# Patient Record
Sex: MALE | Race: WHITE | Employment: OTHER | URBAN - METROPOLITAN AREA
[De-identification: names, ages, dates, MRNs, and addresses within clinical notes are randomized per-mention and may not be internally consistent; named-entity substitution may affect disease eponyms.]

---

## 2019-10-28 ENCOUNTER — OFFICE VISIT (OUTPATIENT)
Dept: NEUROLOGY | Age: 64
End: 2019-10-28

## 2019-10-28 VITALS
DIASTOLIC BLOOD PRESSURE: 78 MMHG | OXYGEN SATURATION: 98 % | BODY MASS INDEX: 23.23 KG/M2 | HEART RATE: 70 BPM | SYSTOLIC BLOOD PRESSURE: 128 MMHG | HEIGHT: 74 IN | WEIGHT: 181 LBS

## 2019-10-28 DIAGNOSIS — R25.1 TREMOR: Primary | ICD-10-CM

## 2019-10-28 DIAGNOSIS — M62.81 MUSCLE WEAKNESS: ICD-10-CM

## 2019-10-28 DIAGNOSIS — G25.0 ESSENTIAL TREMOR: ICD-10-CM

## 2019-10-28 DIAGNOSIS — R68.89 OTHER GENERAL SYMPTOMS AND SIGNS: ICD-10-CM

## 2019-10-28 DIAGNOSIS — E55.9 VITAMIN D DEFICIENCY, UNSPECIFIED: ICD-10-CM

## 2019-10-28 RX ORDER — MINERAL OIL
ENEMA (ML) RECTAL
COMMUNITY

## 2019-10-28 RX ORDER — FAMOTIDINE 40 MG/1
40 TABLET, FILM COATED ORAL DAILY
COMMUNITY

## 2019-10-28 RX ORDER — METOPROLOL TARTRATE 50 MG/1
TABLET ORAL DAILY
COMMUNITY

## 2019-10-28 NOTE — LETTER
10/28/19 Patient: Azul Salas YOB: 1955 Date of Visit: 10/28/2019 Bhaskar Caldera [address] [place] [id number] VIA In Basket Dear Lenore Malin MD, Thank you for referring Mr. Azul Salas to [address] [place] [address] for evaluation. My notes for this consultation are attached. If you have questions, please do not hesitate to call me. I look forward to following your patient along with you. Sincerely, Padilla Thompson, DO

## 2019-10-28 NOTE — PROGRESS NOTES
Neurology Note    Patient ID:  Clearance Marking  [id number]  59 y.o.  1955      Date of Consultation:  October 28, 2019    Referring Physician: Dr. Len Nice    Reason for Consultation:  Hand shaking    Subjective: my hand are shaking. History of Present Illness:   Clearance Marking is a 75 y.o. male who was referred for a neurological evaluation. The patient did just retire about 6 months ago and had not noticed any neurological complaints or concerns at that time. It is been over the last 3 months that his wife initially noticed that he was having a subtle tremor in his hands. It was always worse in the right than the left and involve predominantly the index finger. Every so often, the whole hand would shake but if he gripped something tighter it would go away. An example he gives is holding his coffee cup in the morning if he just lightly picks it up he notices a tremor that is mild but develops in his hand. If he  the cup a slight bit tighter the tremor goes away. He has not dropped anything. He does not notice any weakness, numbness, or tingling. His wife also notes that sometimes when he talks with his hands that his index finger may tremor a little bit. A few years ago, his wife noticed that he did have a slight bit of a head tremor but the wife has not noticed that nearly as much now as she did in the past      This tremor has not prevented him from any of his day-to-day activities. He has still been exercising without any difficulty. He also can do all the activities he needs to do both inside the house and outside the house without any challenges. His buttoning is fine. He can perform all fine finger movements. He did think that his writing was slightly worse but that has improved when he began to  that pencil a little tighter. There has been no new medications that have been started. He has not had any hospitalizations.   He does not know of any recent infection that preceded this or vaccine. It does not prevent him from sleeping well. He denies any difficulty with breathing, swallowing, or chewing. Past Medical History:   Diagnosis Date    Asthma     Hyperlipemia         Past Surgical History:   Procedure Laterality Date    HX TYMPANOSTOMY      [place] ESOPHGL BALO DISTENSION DX STD W/PROVOCATION          Family History   Problem Relation Age of Onset    Stroke Father     Cancer Brother       Sister has a tremor. She is the younger sister. Had it for a couple of years. Mother had a tremor later in life. Social History     Tobacco Use    Smoking status: Never Smoker    Smokeless tobacco: Never Used   Substance Use Topics    Alcohol use: Yes        Allergies   Allergen Reactions    Sulfa (Sulfonamide Antibiotics) Unknown (comments)        Prior to Admission medications    Medication Sig Start Date End Date Taking? Authorizing Provider   famotidine (PEPCID) 40 mg tablet Take 40 mg by mouth daily. Yes Provider, Historical   fexofenadine (ALLEGRA) 180 mg tablet Take  by mouth. Yes Provider, Historical   metoprolol tartrate (LOPRESSOR) 50 mg tablet Take  by mouth daily. Yes Provider, Historical   simvastatin (ZOCOR) 80 mg tablet Take 40 mg by mouth nightly. Yes Other, MD Celso   oxycodone-acetaminophen (PERCOCET) 5-325 mg per tablet Take 1 tablet by mouth every four (4) hours as needed for Pain. 10/12/14   Art Falk PA   diazepam (VALIUM) 5 mg tablet Take 1 tablet by mouth every eight (8) hours as needed (spasm). 10/12/14   Art Falk PA   omeprazole (PRILOSEC) 40 mg capsule Take 40 mg by mouth daily. Kevin, MD Celso   omeprazole (PRILOSEC) 20 mg capsule Take 1 Cap by mouth daily. 1/12/13   Lavelle Sevilla MD   sucralfate (CARAFATE) 1 gram tablet Take 1 Tab by mouth four (4) times daily. 1/12/13   Lavelle Sevilla MD       Review of Systems:    General, constitutional: negative  Eyes, vision: negative  Ears, nose, throat: hearing loss. Cardiovascular, heart: negative  Respiratory: negative  Gastrointestinal: negative  Genitourinary: negative  Musculoskeletal: negative  Skin and integumentary: negative  Psychiatric: negative  Endocrine: negative  Neurological: negative, except for HPI  Hematologic/lymphatic: negative  Allergy/immunology: negative      Objective:     Visit Vitals  /78   Pulse 70   Ht 6' 2\" (1.88 m)   Wt 181 lb (82.1 kg)   SpO2 98%   BMI 23.24 kg/m²       Physical Exam:      General:  appears well nourished in no acute distress  Neck: no carotid bruits  Lungs: clear to auscultation  Heart:  no murmurs, regular rate  Lower extremity: peripheral pulses palpable and no edema  Skin: intact    Neurological exam:    Awake, alert, oriented to person, place and time  Recent and remote memory were normal  Attention and concentration were intact  Language was intact. There was no aphasia  Speech: no dysarthria. There is no tremor associated with his speech  Fund of knowledge was preserved  There is no head tremor. Cranial nerves:   II-XII were tested    Perrrla  Fundoscopic examination revealed venous pulsations and no clear abnormalities  Visual fields were full  Eomi, no evidence of nystagmus  Facial sensation:  normal and symmetric  Facial motor: normal and symmetric  Hearing intact  SCM strength intact  Tongue: midline without fasciculations    Motor: Tone normal.  There was no spasticity or cogwheel rigidity    No evidence of fasciculations    Strength testing:   deltoid triceps biceps Wrist ext. Wrist flex. intrinsics Hip flex. Hip ext. Knee ext. Knee flex Dorsi flex Plantar flex   Right 5 5 5 5 5 5 5 5 5 5 5 5   Left 5 5 5 5 5 5 5 5 5 5 5 5         Sensory:  Upper extremity: intact to pp, light touch, and vibration > 10 seconds  Lower extremity: intact to pp, light touch, and vibration > 10 seconds    Reflexes:    Right Left  Biceps  2 2  Triceps 2 2  Brachiorad.  2 2  Patella  2 2  Achilles 2 2    Plantar response: flexor bilaterally      Cerebellar testing: finger/nose and saloni were intact. There was a mild action tremor as well as postural tremor which impacted predominantly the index finger on both of his hands. His finger tapping was not impacted. Romberg: absent    Gait: steady. Heel, toe, and tandem gait were normal    Labs:     Lab Results   Component Value Date/Time    Sodium 141 10/12/2014 10:55 AM    Potassium 3.7 10/12/2014 10:55 AM    Chloride 107 10/12/2014 10:55 AM    Glucose 124 (H) 10/12/2014 10:55 AM    BUN 15 10/12/2014 10:55 AM    Creatinine 1.06 10/12/2014 10:55 AM    Calcium 9.4 10/12/2014 10:55 AM    WBC 6.9 10/12/2014 10:55 AM    HCT 43.2 10/12/2014 10:55 AM    HGB 14.6 10/12/2014 10:55 AM    PLATELET 655 88/94/8466 10:55 AM       Imaging:    No results found for this or any previous visit. Results from [place] encounter on 10/12/14   CT ABD PELV WO CONT    Narrative **Final Report**       ICD Codes / Adm. Diagnosis: 061958   / Flank Pain  Abdominal Pain  Examination:  CT ABD PELVIS WO CON  - [id number] - Oct 12 2014 10:57AM  Accession No:  [id number]  Reason:  Abd Pain/KS      REPORT:  Indication right flank pain    Axial computed tomographic cuts through the abdomen and pelvis without IV   contrast.    There are some cystic changes in the left lobe of the liver similar to prior   examination 8/15/2009. When considering non-IV contrast technique, no   space-occupying lesions within the spleen or pancreas or kidneys. No   conclusive hydronephrosis on the right. Probable calcifications posterior to   the right aspect of the bladder but unchanged since 2009. A dilated fluid-filled appendix is not demonstrated. Slight fascial   thickening in the lower lateral right lower quadrant new since 2009. Indeterminate significance. IMPRESSION:  1. Chronic changes liver of unknown etiology but similar to before generally. 2. No hydronephrosis demonstrated.   3. Slight fascial thickening lower right lower quadrant new since 2009. Unknown etiology or significance. Followup to exclude early inflammatory   process, although etiology unknown, recommended. Signing/Reading Doctor: Kalli Baeza ([id number])    Approved: Kalli Baeza ([id number])  Oct 12 2014 11:15AM                                        Assessment and Plan:   The patient is a pleasant 58-year-old gentleman who presents with a slowly progressive tremor. His neurological examination reveals evidence of an action and essential tremor but no evidence of resting tremor or cogwheel rigidity. Tremor: This most likely is an essential tremor. He does have a strong family history of a tremor in both his mother and his sister. I would like to check him for reversible causes of tremors and will send off a thyroid, B12 and vitamin D levels today  We did talk about potential medications that can help with an essential tremor. We did discuss primidone and propranolol. At this time, the tremor does not impact him to the point that he would like to take a medication for this. He will continue to monitor this and discuss with his wife and may consider a medication in the future. He was reassured that at today's visit there is no evidence of parkinsonism. Given the rest of his examination, I do not think that neuroimaging is necessary at this time but rather perform serological testing. He will begin to track if he notices there are other things that may improve or exacerbate his tremor for the follow-up visit. The patient should return to clinic in 4 months  Renewed medication:none today    I spent  60   minutes with the patient  with over 50 % of the time counseling and coordinating the care plan in regards to the diagnosis, diagnostic testing, and treatment plan. The patient had the ability to ask questions and all questions were answered.          Signed By:  Greer Harris DO FAAN    October 28, 2019

## 2019-10-28 NOTE — PATIENT INSTRUCTIONS

## 2019-10-29 LAB
25(OH)D3+25(OH)D2 SERPL-MCNC: 26.5 NG/ML (ref 30–100)
T4 FREE SERPL-MCNC: 0.95 NG/DL (ref 0.82–1.77)
TSH SERPL DL<=0.005 MIU/L-ACNC: 1.82 UIU/ML (ref 0.45–4.5)
VIT B12 SERPL-MCNC: 270 PG/ML (ref 232–1245)

## 2019-10-29 RX ORDER — ERGOCALCIFEROL 1.25 MG/1
50000 CAPSULE ORAL
Qty: 12 CAP | Refills: 1 | Status: SHIPPED | OUTPATIENT
Start: 2019-10-29 | End: 2020-01-27

## 2019-10-29 NOTE — PROGRESS NOTES
Xiomaracarolina Hussein,    Your vitamin D level was low. I will send a prescription strength Vitamin D that I would like you to take for the next 12 weeks. Thanks.   Dr. Domenico Baker

## 2019-10-29 NOTE — TELEPHONE ENCOUNTER
Spoke to patient and relayed results of lab per dr. Diana Del Real for vit.  D   Let him know to  script for vitamin D

## 2019-11-28 DIAGNOSIS — G25.0 ESSENTIAL TREMOR: ICD-10-CM

## 2019-11-28 DIAGNOSIS — R25.1 TREMOR: ICD-10-CM

## 2019-11-28 DIAGNOSIS — E55.9 VITAMIN D DEFICIENCY, UNSPECIFIED: ICD-10-CM

## 2019-11-28 DIAGNOSIS — M62.81 MUSCLE WEAKNESS: ICD-10-CM

## 2020-01-10 ENCOUNTER — TELEPHONE (OUTPATIENT)
Dept: NEUROLOGY | Age: 65
End: 2020-01-10

## 2020-02-17 ENCOUNTER — OFFICE VISIT (OUTPATIENT)
Dept: NEUROLOGY | Age: 65
End: 2020-02-17

## 2020-02-17 VITALS
OXYGEN SATURATION: 97 % | DIASTOLIC BLOOD PRESSURE: 76 MMHG | BODY MASS INDEX: 23.1 KG/M2 | HEIGHT: 74 IN | HEART RATE: 77 BPM | SYSTOLIC BLOOD PRESSURE: 132 MMHG | WEIGHT: 180 LBS

## 2020-02-17 DIAGNOSIS — G25.0 ESSENTIAL TREMOR: Primary | ICD-10-CM

## 2020-02-17 NOTE — PROGRESS NOTES
Neurology Note    Patient ID:  Jamari Fierro  [id number]  46 y.o.  1955      Date of Consultation:  February 17, 2020    Referring Physician: Dr. Christa Myrick    Reason for Consultation:  Hand shaking    Subjective: my hand are shaking. History of Present Illness:   Jamari Fierro is a 75 y.o. male who returns to the neurology clinic at [facility] for an evaluation. I did initially see the patient on October 28, 2019. Please see my initial consultation which outlines his history of present illness, examination, and treatment plan. In brief summary he was being seen for an essential tremor. After that visit, he did have serology performed and it was recommended that he look into keeping a diary of potential triggers. Medications were discussed at that last visit but he was not interested in a medication to help his tremor. He was found to have a low vitamin D level and started on replacement therapy. The patient reports that since he was started on the vitamin D replacement he does feel that his tremor is less noticeable. Is definitely no worse than his last visit. There are certain times where the tremor may be a slight bit more noticeable but it is not impacting his day-to-day life. He has had no other medical conditions that have arisen and he has not been hospitalized. Past Medical History:   Diagnosis Date    Asthma     Hyperlipemia         Past Surgical History:   Procedure Laterality Date    HX TYMPANOSTOMY      [place] ESOPHGL BALO DISTENSION DX STD W/PROVOCATION          Family History   Problem Relation Age of Onset    Stroke Father     Cancer Brother       Sister has a tremor. She is the younger sister. Had it for a couple of years. Mother had a tremor later in life. Social History     Tobacco Use    Smoking status: Never Smoker    Smokeless tobacco: Never Used   Substance Use Topics    Alcohol use:  Yes        Allergies   Allergen Reactions    Sulfa (Sulfonamide Antibiotics) Unknown (comments)        Prior to Admission medications    Medication Sig Start Date End Date Taking? Authorizing Provider   famotidine (PEPCID) 40 mg tablet Take 40 mg by mouth daily. Yes Provider, Historical   fexofenadine (ALLEGRA) 180 mg tablet Take  by mouth. Yes Provider, Historical   metoprolol tartrate (LOPRESSOR) 50 mg tablet Take  by mouth daily. Yes Provider, Historical   simvastatin (ZOCOR) 80 mg tablet Take 40 mg by mouth nightly. Yes Other, MD Celso   omeprazole (PRILOSEC) 40 mg capsule Take 40 mg by mouth daily. Yes Other, MD Celso   omeprazole (PRILOSEC) 20 mg capsule Take 1 Cap by mouth daily. 1/12/13  Yes Gama Cheatham MD   oxycodone-acetaminophen (PERCOCET) 5-325 mg per tablet Take 1 tablet by mouth every four (4) hours as needed for Pain. 10/12/14   Isiah Falk PA   diazepam (VALIUM) 5 mg tablet Take 1 tablet by mouth every eight (8) hours as needed (spasm). 10/12/14   Isiah Falk PA   sucralfate (CARAFATE) 1 gram tablet Take 1 Tab by mouth four (4) times daily. 1/12/13   Gama Cheatham MD       Review of Systems:    General, constitutional: negative  Eyes, vision: negative  Ears, nose, throat: hearing loss.    Cardiovascular, heart: negative  Respiratory: negative  Gastrointestinal: negative  Genitourinary: negative  Musculoskeletal: negative  Skin and integumentary: negative  Psychiatric: negative  Endocrine: negative  Neurological: negative, except for HPI  Hematologic/lymphatic: negative  Allergy/immunology: negative      Objective:     Visit Vitals  /76   Pulse 77   Ht 6' 2\" (1.88 m)   Wt 180 lb (81.6 kg)   SpO2 97%   BMI 23.11 kg/m²       Physical Exam:      General:  appears well nourished in no acute distress  Neck: no carotid bruits  Lungs: clear to auscultation  Heart:  no murmurs, regular rate  Lower extremity: peripheral pulses palpable and no edema  Skin: intact    Neurological exam:    Awake, alert, oriented to person, place and time  Recent and remote memory were normal  Attention and concentration were intact  Language was intact. There was no aphasia  Speech: no dysarthria. There is no tremor associated with his speech  Fund of knowledge was preserved  There is no head tremor. Cranial nerves:   II-XII were tested    Perrrla  Visual fields were full  Eomi, no evidence of nystagmus  Facial sensation:  normal and symmetric  Facial motor: normal and symmetric  Hearing intact  SCM strength intact  Tongue: midline without fasciculations    Motor: Tone normal.  There was no spasticity or cogwheel rigidity    No evidence of fasciculations    Strength testing:   deltoid triceps biceps Wrist ext. Wrist flex. intrinsics Hip flex. Hip ext. Knee ext. Knee flex Dorsi flex Plantar flex   Right 5 5 5 5 5 5 5 5 5 5 5 5   Left 5 5 5 5 5 5 5 5 5 5 5 5         Sensory:  Upper extremity: intact to pp, light touch, and vibration  Lower extremity: intact to pp, light touch, and vibration  Reflexes:    Right Left  Biceps  2 2  Triceps 2 2  Brachiorad. 2 2  Patella  2 2  Achilles 2 2    Plantar response:  flexor bilaterally      Cerebellar testing: finger/nose and saloni were intact. There was a very mild action tremor as well as postural tremor which impacted predominantly the index finger on both of his hands. It is less on today's examination when compared to last visit. His finger tapping was not impacted. Romberg: absent    Gait: steady. Labs:     Lab Results   Component Value Date/Time    Sodium 141 10/12/2014 10:55 AM    Potassium 3.7 10/12/2014 10:55 AM    Chloride 107 10/12/2014 10:55 AM    Glucose 124 (H) 10/12/2014 10:55 AM    BUN 15 10/12/2014 10:55 AM    Creatinine 1.06 10/12/2014 10:55 AM    Calcium 9.4 10/12/2014 10:55 AM    WBC 6.9 10/12/2014 10:55 AM    HCT 43.2 10/12/2014 10:55 AM    HGB 14.6 10/12/2014 10:55 AM    PLATELET 934 44/54/4546 10:55 AM       Imaging:    No results found for this or any previous visit.     Results from Hospital Encounter encounter on 10/12/14   CT ABD PELV WO CONT    Narrative **Final Report**       ICD Codes / Adm. Diagnosis: 431357   / Flank Pain  Abdominal Pain  Examination:  CT ABD PELVIS WO CON  - [id number] - Oct 12 2014 10:57AM  Accession No:  [id number]  Reason:  Abd Pain/KS      REPORT:  Indication right flank pain    Axial computed tomographic cuts through the abdomen and pelvis without IV   contrast.    There are some cystic changes in the left lobe of the liver similar to prior   examination 8/15/2009. When considering non-IV contrast technique, no   space-occupying lesions within the spleen or pancreas or kidneys. No   conclusive hydronephrosis on the right. Probable calcifications posterior to   the right aspect of the bladder but unchanged since 2009. A dilated fluid-filled appendix is not demonstrated. Slight fascial   thickening in the lower lateral right lower quadrant new since 2009. Indeterminate significance. IMPRESSION:  1. Chronic changes liver of unknown etiology but similar to before generally. 2. No hydronephrosis demonstrated. 3. Slight fascial thickening lower right lower quadrant new since 2009. Unknown etiology or significance. Followup to exclude early inflammatory   process, although etiology unknown, recommended. Signing/Reading Doctor: Arabella Morrison ([id number])    Approved: Arabella Morrison ([id number])  Oct 12 2014 11:15AM                                    Assessment and Plan:   The patient is a pleasant 12-year-old gentleman who presents with a slowly progressive tremor. His neurological examination reveals evidence of an action and essential tremor but no evidence of resting tremor or cogwheel rigidity. Tremor:  His examination is consistent with an essential tremor. He does have a strong family history of a tremor in both his mother and his sister.   He will continue with his vitamin D supplementation and switch to 2000 international units daily. We did talk about potential medications that can help with an essential tremor. We did discuss primidone and propranolol. At this time, the tremor does not impact him to the point that he would like to take a medication for this. He will continue to monitor this and may consider a medication in the future. He was reassured that at today's visit there is no evidence of parkinsonism. The patient should return to clinic in 8 months  Renewed medication:none today    I spent  25 minutes with the patient  with over 50 % of the time counseling and coordinating the care plan in regards to the diagnosis, diagnostic testing, and treatment plan. The patient had the ability to ask questions and all questions were answered.          Signed By:  Remigio Thacker DO FAAN    February 17, 2020

## 2020-02-17 NOTE — LETTER
2/17/20 Patient: Jamari Fierro YOB: 1955 Date of Visit: 2/17/2020 Danita Russell, [address] [place] [address] VIA Facsimile: [phone] Dear Danita Russell, PA, Thank you for referring Mr. Jamari Fierro to [address] [place] [address] for evaluation. My notes for this consultation are attached. If you have questions, please do not hesitate to call me. I look forward to following your patient along with you. Sincerely, Padilla Thompson, DO

## 2021-05-25 RX ORDER — ERGOCALCIFEROL 1.25 MG/1
50000 CAPSULE ORAL
Qty: 12 CAPSULE | Refills: 1 | Status: SHIPPED | OUTPATIENT
Start: 2021-05-25

## 2022-01-24 ENCOUNTER — HOSPITAL ENCOUNTER (OUTPATIENT)
Dept: PREADMISSION TESTING | Age: 67
Discharge: HOME | End: 2022-01-24
Payer: MEDICARE

## 2022-01-24 LAB
SARS-COV-2, XPLCVT: NOT DETECTED
SOURCE, COVRS: NORMAL

## 2022-01-24 PROCEDURE — U0005 INFEC AGEN DETEC AMPLI PROBE: HCPCS

## 2022-01-28 ENCOUNTER — ANESTHESIA EVENT (OUTPATIENT)
Dept: ENDOSCOPY | Age: 67
End: 2022-01-28
Payer: MEDICARE

## 2022-01-28 ENCOUNTER — HOSPITAL ENCOUNTER (OUTPATIENT)
Age: 67
Setting detail: OUTPATIENT SURGERY
Discharge: HOME | End: 2022-01-28
Attending: SPECIALIST | Admitting: SPECIALIST
Payer: MEDICARE

## 2022-01-28 ENCOUNTER — ANESTHESIA (OUTPATIENT)
Dept: ENDOSCOPY | Age: 67
End: 2022-01-28
Payer: MEDICARE

## 2022-01-28 VITALS
SYSTOLIC BLOOD PRESSURE: 138 MMHG | HEART RATE: 65 BPM | OXYGEN SATURATION: 98 % | DIASTOLIC BLOOD PRESSURE: 78 MMHG | WEIGHT: 175.9 LBS | TEMPERATURE: 97.7 F | RESPIRATION RATE: 15 BRPM | HEIGHT: 74 IN | BODY MASS INDEX: 22.58 KG/M2

## 2022-01-28 PROCEDURE — 2709999900 HC NON-CHARGEABLE SUPPLY: Performed by: SPECIALIST

## 2022-01-28 PROCEDURE — 77030013992 HC SNR POLYP ENDOSC BSC -B: Performed by: SPECIALIST

## 2022-01-28 PROCEDURE — 74011250636 HC RX REV CODE- 250/636: Performed by: SPECIALIST

## 2022-01-28 PROCEDURE — 76040000019: Performed by: SPECIALIST

## 2022-01-28 PROCEDURE — 76060000031 HC ANESTHESIA FIRST 0.5 HR: Performed by: SPECIALIST

## 2022-01-28 PROCEDURE — 74011250636 HC RX REV CODE- 250/636: Performed by: REGISTERED NURSE

## 2022-01-28 PROCEDURE — 74011000250 HC RX REV CODE- 250: Performed by: REGISTERED NURSE

## 2022-01-28 RX ORDER — SODIUM CHLORIDE 0.9 % (FLUSH) 0.9 %
5-40 SYRINGE (ML) INJECTION AS NEEDED
Status: DISCONTINUED | OUTPATIENT
Start: 2022-01-28 | End: 2022-01-28 | Stop reason: HOSPADM

## 2022-01-28 RX ORDER — SODIUM CHLORIDE 0.9 % (FLUSH) 0.9 %
5-40 SYRINGE (ML) INJECTION EVERY 8 HOURS
Status: DISCONTINUED | OUTPATIENT
Start: 2022-01-28 | End: 2022-01-28 | Stop reason: HOSPADM

## 2022-01-28 RX ORDER — DEXTROMETHORPHAN/PSEUDOEPHED 2.5-7.5/.8
DROPS ORAL
Status: DISCONTINUED
Start: 2022-01-28 | End: 2022-01-28 | Stop reason: HOSPADM

## 2022-01-28 RX ORDER — SODIUM CHLORIDE 9 MG/ML
50 INJECTION, SOLUTION INTRAVENOUS CONTINUOUS
Status: DISCONTINUED | OUTPATIENT
Start: 2022-01-28 | End: 2022-01-28 | Stop reason: HOSPADM

## 2022-01-28 RX ORDER — LIDOCAINE HYDROCHLORIDE 20 MG/ML
INJECTION, SOLUTION EPIDURAL; INFILTRATION; INTRACAUDAL; PERINEURAL AS NEEDED
Status: DISCONTINUED | OUTPATIENT
Start: 2022-01-28 | End: 2022-01-28 | Stop reason: HOSPADM

## 2022-01-28 RX ORDER — DEXTROMETHORPHAN/PSEUDOEPHED 2.5-7.5/.8
1.2 DROPS ORAL
Status: DISCONTINUED | OUTPATIENT
Start: 2022-01-28 | End: 2022-01-28 | Stop reason: HOSPADM

## 2022-01-28 RX ORDER — PROPOFOL 10 MG/ML
INJECTION, EMULSION INTRAVENOUS AS NEEDED
Status: DISCONTINUED | OUTPATIENT
Start: 2022-01-28 | End: 2022-01-28 | Stop reason: HOSPADM

## 2022-01-28 RX ADMIN — PROPOFOL 20 MG: 10 INJECTION, EMULSION INTRAVENOUS at 07:48

## 2022-01-28 RX ADMIN — LIDOCAINE HYDROCHLORIDE 40 MG: 20 INJECTION, SOLUTION EPIDURAL; INFILTRATION; INTRACAUDAL; PERINEURAL at 07:34

## 2022-01-28 RX ADMIN — PROPOFOL 60 MG: 10 INJECTION, EMULSION INTRAVENOUS at 07:39

## 2022-01-28 RX ADMIN — PROPOFOL 90 MG: 10 INJECTION, EMULSION INTRAVENOUS at 07:34

## 2022-01-28 RX ADMIN — SODIUM CHLORIDE 50 ML/HR: 9 INJECTION, SOLUTION INTRAVENOUS at 07:18

## 2022-01-28 NOTE — DISCHARGE INSTRUCTIONS
Babita Nickersongermania  [id number]  1955    COLON DISCHARGE INSTRUCTIONS  Discomfort:  Redness at IV site- apply warm compress to area; if redness or soreness persist- contact your physician  There may be a slight amount of blood passed from the rectum  Gaseous discomfort- walking, belching will help relieve any discomfort  You may not operate a vehicle for 12 hours  You may not engage in an occupation involving machinery or appliances for rest of today  You may not drink alcoholic beverages for at least 12 hours  Avoid making any critical decisions for at least 24 hour  DIET:   Regular diet. - however -  remember your colon is empty and a heavy meal will produce gas. Avoid these foods:  vegetables, fried / greasy foods, carbonated drinks for today. MEDICATIONS:        Regarding Aspirin or Nonsteroidal medications, please see below. ACTIVITY:  You may resume your normal daily activities it is recommended that you spend the remainder of the day resting -  avoid any strenuous activity. CALL M.D. ANY SIGN OF:  Increasing pain, nausea, vomiting  Abdominal distension (swelling)  New increased bleeding (oral or rectal)  Fever (chills)  Pain in chest area  Bloody discharge from nose or mouth  Shortness of breath    Tylenol as needed for pain.       Follow-up Instructions:   Call Dr. Yoel Rodriges for questions about procedure at telephone #  [phone]              Repeat Colonoscopy in 10 years

## 2022-01-28 NOTE — ANESTHESIA PREPROCEDURE EVALUATION
Relevant Problems   No relevant active problems       Anesthetic History   No history of anesthetic complications            Review of Systems / Medical History  Patient summary reviewed, nursing notes reviewed and pertinent labs reviewed    Pulmonary            Asthma : well controlled       Neuro/Psych   Within defined limits           Cardiovascular    Hypertension: well controlled              Exercise tolerance: >4 METS     GI/Hepatic/Renal  Within defined limits              Endo/Other  Within defined limits           Other Findings   Comments: Asthma  Hyperlipemia  Hypertension           Physical Exam    Airway  Mallampati: II  TM Distance: 4 - 6 cm  Neck ROM: normal range of motion   Mouth opening: Normal     Cardiovascular  Regular rate and rhythm,  S1 and S2 normal,  no murmur, click, rub, or gallop  Rhythm: regular  Rate: normal         Dental  No notable dental hx       Pulmonary  Breath sounds clear to auscultation               Abdominal  GI exam deferred       Other Findings            Anesthetic Plan    ASA: 2  Anesthesia type: MAC          Induction: Intravenous  Anesthetic plan and risks discussed with: Patient

## 2022-01-28 NOTE — H&P
Gastroenterology Outpatient History and Physical    Patient: Babita Bryson    Physician: Raoul Hand MD    Vital Signs: Blood pressure (!) 156/89, pulse 81, temperature 98 °F (36.7 °C), resp. rate 15, height 6' 2\" (1.88 m), weight 79.8 kg (175 lb 14.4 oz), SpO2 99 %. Allergies: Allergies   Allergen Reactions    Sulfa (Sulfonamide Antibiotics) Unknown (comments)       Chief Complaint: CRC screening    History of Present Illness: 75 yo WM for screening colonoscopy. Justification for Procedure: above    History:  Past Medical History:   Diagnosis Date    Asthma     Hyperlipemia     Hypertension       Past Surgical History:   Procedure Laterality Date    HX TYMPANOSTOMY      OH ESOPHGL BALO DISTENSION DX STD W/PROVOCATION        Social History     Socioeconomic History    Marital status:    Tobacco Use    Smoking status: Never Smoker    Smokeless tobacco: Never Used   Substance and Sexual Activity    Alcohol use: Yes     Comment: occasinal    Drug use: Not Currently      Family History   Problem Relation Age of Onset    Stroke Father     Cancer Brother        Medications:   Prior to Admission medications    Medication Sig Start Date End Date Taking? Authorizing Provider   ergocalciferol (ERGOCALCIFEROL) 1,250 mcg (50,000 unit) capsule Take 1 Capsule by mouth every seven (7) days. 5/25/21  Yes Padilla Thompson,    famotidine (PEPCID) 40 mg tablet Take 40 mg by mouth daily. Yes Provider, Historical   fexofenadine (ALLEGRA) 180 mg tablet Take  by mouth. Yes Provider, Historical   metoprolol tartrate (LOPRESSOR) 50 mg tablet Take  by mouth daily. Yes Provider, Historical   simvastatin (ZOCOR) 80 mg tablet Take 40 mg by mouth nightly. Yes Other, MD Celso       Physical Exam:   General: alert, no distress   HEENT: Head: Normocephalic, no lesions, without obvious abnormality.    Heart: regular rate and rhythm, S1, S2 normal, no murmur, click, rub or gallop   Lungs: chest clear, no wheezing, rales, normal symmetric air entry   Abdominal: soft, NT/ND+ BS   Neurological: Grossly normal   Extremities: extremities normal, atraumatic, no cyanosis or edema     Findings/Diagnosis: CRC screening    Plan of Care/Planned Procedure: Colonoscopy

## 2022-01-28 NOTE — ANESTHESIA POSTPROCEDURE EVALUATION
Procedure(s):  ENDOSCOPIC POLYPECTOMY. general    Anesthesia Post Evaluation      Multimodal analgesia: multimodal analgesia used between 6 hours prior to anesthesia start to PACU discharge  Patient location during evaluation: PACU  Patient participation: complete - patient participated  Level of consciousness: sleepy but conscious and responsive to verbal stimuli  Pain score: 2  Pain management: adequate  Airway patency: patent  Anesthetic complications: no  Cardiovascular status: acceptable  Respiratory status: acceptable  Hydration status: acceptable  Comments: +Post-Anesthesia Evaluation and Assessment    Patient: Dean Butterfield MRN: [id number]  SSN: [id number]   YOB: 1955  Age: 74 y.o. Sex: adult      Cardiovascular Function/Vital Signs    BP (!) 102/53   Pulse 69   Temp 36.7 °C (98 °F)   Resp 14   Ht 6' 2\" (1.88 m)   Wt 79.8 kg (175 lb 14.4 oz)   SpO2 100%   BMI 22.58 kg/m²     Patient is status post Procedure(s):  ENDOSCOPIC POLYPECTOMY. Nausea/Vomiting: Controlled. Postoperative hydration reviewed and adequate. Pain:  Pain Scale 1: Numeric (0 - 10) (01/28/22 5539)  Pain Intensity 1: 0 (01/28/22 6019)   Managed. Neurological Status: At baseline. Mental Status and Level of Consciousness: Arousable. Pulmonary Status:   O2 Device: None (01/28/22 0752)   Adequate oxygenation and airway patent. Complications related to anesthesia: None    Post-anesthesia assessment completed. No concerns. Signed By: Kash Hooper MD    1/28/2022  Post anesthesia nausea and vomiting:  controlled  Final Post Anesthesia Temperature Assessment:  Normothermia (36.0-37.5 degrees C)      INITIAL Post-op Vital signs:   Vitals Value Taken Time   BP     Temp     Pulse 69 01/28/22 0759   Resp 15 01/28/22 0759   SpO2 98 % 01/28/22 0759   Vitals shown include unvalidated device data.

## 2022-01-28 NOTE — PROGRESS NOTES
Endoscope was pre-cleaned at the bedside immediately following procedure by Gilberto Downey. For medications administered by anesthesia, see anesthesia chart.

## 2022-01-28 NOTE — PROCEDURES
Colonoscopy Procedure Note    Indications:   Screening colonoscopy    Referring Physician: HIRAL Mishra  Anesthesia/Sedation: MAC anesthesia Propofol  Endoscopist:  Dr. Martines Friday    Procedure in Detail:  Informed consent was obtained for the procedure, including sedation. Risks of perforation, hemorrhage, adverse drug reaction, and aspiration were discussed. The patient was placed in the left lateral decubitus position. Based on the pre-procedure assessment, including review of the patient's medical history, medications, allergies, and review of systems, Melissa Palma had been deemed to be an appropriate candidate for moderate sedation; Melissa Palma was therefore sedated with the medications listed above. The patient was monitored continuously with ECG tracing, pulse oximetry, blood pressure monitoring, and direct observations. A rectal examination was performed. The [id number] was inserted into the rectum and advanced under direct vision to the terminal ileum. The quality of the colonic preparation was adequate. A careful inspection was made as the colonoscope was withdrawn, including a retroflexed view of the rectum; findings and interventions are described below. Appropriate photodocumentation was obtained. Findings:   1. Scope advanced to the cecum and terminal ileum. 2.  Scattered diverticulosis in sigmoid colon. 3.  Preparation was adequate. 4.  Normal mucosa visualized throughout. 5.  No polyps. 6.  Small internal hemorrhoids. Therapies:  none    Specimen:  none  Specimens were collected as described above and sent to pathology. Complications: None were encountered during the procedure. EBL: < 10 ml. Recommendations:     - For colon cancer screening in this average-risk patient, colonoscopy may be repeated in 10 years. Thank you for entrusting me with this patient's care.  Please do not hesitate to contact me with any questions or if I can be of assistance with any of your other patients' GI needs.   Signed By: Alton Fleming MD                        January 28, 2022

## 2023-05-11 RX ORDER — METOPROLOL TARTRATE 50 MG/1
TABLET, FILM COATED ORAL DAILY
COMMUNITY

## 2023-05-11 RX ORDER — ERGOCALCIFEROL 1.25 MG/1
CAPSULE ORAL
COMMUNITY
Start: 2021-05-25

## 2023-05-11 RX ORDER — SIMVASTATIN 80 MG
40 TABLET ORAL NIGHTLY
COMMUNITY

## 2023-05-11 RX ORDER — FAMOTIDINE 40 MG/1
40 TABLET, FILM COATED ORAL DAILY
COMMUNITY

## 2023-05-11 RX ORDER — FEXOFENADINE HCL 180 MG/1
TABLET ORAL
COMMUNITY

## (undated) DEVICE — Device

## (undated) DEVICE — TOWEL 4 PLY TISS 19X30 SUE WHT

## (undated) DEVICE — SYR 10ML LUER LOK 1/5ML GRAD --

## (undated) DEVICE — CATH IV AUTOGRD BC PNK 20GA 25 -- INSYTE

## (undated) DEVICE — BASIN EMSIS 16OZ GRAPHITE PLAS KID SHP MOLD GRAD FOR ORAL

## (undated) DEVICE — NON-REM POLYHESIVE PATIENT RETURN ELECTRODE: Brand: VALLEYLAB

## (undated) DEVICE — HYPODERMIC SAFETY NEEDLE: Brand: MAGELLAN

## (undated) DEVICE — NEONATAL-ADULT SPO2 SENSOR: Brand: NELLCOR

## (undated) DEVICE — 1200 GUARD II KIT W/5MM TUBE W/O VAC TUBE: Brand: GUARDIAN

## (undated) DEVICE — SOLIDIFIER FLD 2OZ 1500CC N DISINF IN BTL DISP SAFESORB

## (undated) DEVICE — CONTAINER SPEC 20 ML LID NEUT BUFF FORMALIN 10 % POLYPR STS

## (undated) DEVICE — ELECTRODE,RADIOTRANSLUCENT,FOAM,5PK: Brand: MEDLINE

## (undated) DEVICE — STRAINER URIN CALC RNL MSH -- CONVERT TO ITEM 357634

## (undated) DEVICE — TRAP,MUCUS SPECIMEN, 80CC: Brand: MEDLINE

## (undated) DEVICE — SNARE ENDOSCP M L240CM W27MM SHTH DIA2.4MM CHN 2.8MM OVL

## (undated) DEVICE — SYR 3ML LL TIP 1/10ML GRAD --

## (undated) DEVICE — SET ADMIN 16ML TBNG L100IN 2 Y INJ SITE IV PIGGY BK DISP

## (undated) DEVICE — Z DISCONTINUED PER MEDLINE LINE GAS SAMPLING O2/CO2 LNG AD 13 FT NSL W/ TBNG FILTERLINE